# Patient Record
Sex: MALE | Race: BLACK OR AFRICAN AMERICAN | Employment: UNEMPLOYED | URBAN - METROPOLITAN AREA
[De-identification: names, ages, dates, MRNs, and addresses within clinical notes are randomized per-mention and may not be internally consistent; named-entity substitution may affect disease eponyms.]

---
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
## 2025-06-24 ENCOUNTER — OFFICE VISIT (OUTPATIENT)
Age: 25
End: 2025-06-24

## 2025-06-24 VITALS
WEIGHT: 293 LBS | BODY MASS INDEX: 44.41 KG/M2 | TEMPERATURE: 98.3 F | RESPIRATION RATE: 18 BRPM | HEIGHT: 68 IN | DIASTOLIC BLOOD PRESSURE: 86 MMHG | OXYGEN SATURATION: 97 % | HEART RATE: 76 BPM | SYSTOLIC BLOOD PRESSURE: 136 MMHG

## 2025-06-24 DIAGNOSIS — Z71.1 CONCERN ABOUT SEXUALLY TRANSMITTED DISEASE IN MALE WITHOUT DIAGNOSIS: ICD-10-CM

## 2025-06-24 DIAGNOSIS — R03.0 ELEVATED BLOOD PRESSURE READING: ICD-10-CM

## 2025-06-24 DIAGNOSIS — J02.9 SORE THROAT: Primary | ICD-10-CM

## 2025-06-24 PROBLEM — B34.9 VIRAL INFECTION: Status: ACTIVE | Noted: 2025-02-06

## 2025-06-24 LAB — S PYO AG THROAT QL: NORMAL

## 2025-06-24 NOTE — PATIENT INSTRUCTIONS
Thank you for visiting [facility] today.    We will call you with your test results once they are received.  Salt water gargles  Ibuprofen/Tylenol      A survey will be sent shortly to your phone/email.  Please complete this so we may know how to better serve you in the future.       DASH Diet: After Your Visit  Your Care Instructions  The DASH diet is an eating plan that can help lower your blood pressure. DASH stands for Dietary Approaches to Stop Hypertension. Hypertension is high blood pressure.  The DASH diet focuses on eating foods that are high in calcium, potassium, and magnesium. These nutrients can lower blood pressure. The foods that are highest in these nutrients are fruits, vegetables, low-fat dairy products, nuts, seeds, and legumes. But taking calcium, potassium, and magnesium supplements instead of eating foods that are high in those nutrients does not have the same effect. The DASH diet also includes whole grains, fish, and poultry.  The DASH diet is one of several lifestyle changes your doctor may recommend to lower your high blood pressure. Your doctor may also want you to decrease the amount of sodium in your diet. Lowering sodium while following the DASH diet can lower blood pressure even further than just the DASH diet alone.  Follow-up care is a key part of your treatment and safety. Be sure to make and go to all appointments, and call your doctor if you are having problems. It’s also a good idea to know your test results and keep a list of the medicines you take.  How can you care for yourself at home?  Following the DASH diet  Eat 4 to 5 servings of fruit each day. A serving is 1 medium-sized piece of fruit, ½ cup chopped or canned fruit, 1/4 cup dried fruit, or 4 ounces (½ cup) of fruit juice. Choose fruit more often than fruit juice.  Eat 4 to 5 servings of vegetables each day. A serving is 1 cup of lettuce or raw leafy vegetables, ½ cup of chopped or cooked vegetables, or 4

## 2025-06-24 NOTE — PROGRESS NOTES
Subjective     Chief Complaint   Patient presents with    Pharyngitis     Symptoms for 3 days    Other     May also want std testing done       Patient is a 25-year-old male presenting with a sore throat and concern for STD.  Patient states he had oral sex recently and ever since then has had a sore throat and swollen glands.          History reviewed. No pertinent past medical history.    History reviewed. No pertinent surgical history.    History reviewed. No pertinent family history.    No Known Allergies    Social History     Tobacco Use    Smoking status: Unknown       Vitals:    06/24/25 1848   BP: 136/86   Pulse:    Resp:    Temp:    SpO2:        Objective     Physical Exam  Vitals and nursing note reviewed.   Constitutional:       General: He is not in acute distress.     Appearance: Normal appearance. He is not ill-appearing.   HENT:      Head: Normocephalic and atraumatic.      Mouth/Throat:      Mouth: Mucous membranes are moist.      Pharynx: No oropharyngeal exudate or posterior oropharyngeal erythema.      Comments: White spots on tonsil - possible stones  Cardiovascular:      Rate and Rhythm: Normal rate.      Pulses: Normal pulses.   Pulmonary:      Effort: Pulmonary effort is normal.   Lymphadenopathy:      Cervical: No cervical adenopathy.   Skin:     General: Skin is warm and dry.   Neurological:      Mental Status: He is alert and oriented to person, place, and time.         Assessment & Plan     Diagnoses and all orders for this visit:  Sore throat  -     POCT rapid strep A  -     CT/NG by ELIANE, Pharyngeal; Future  Concern about sexually transmitted disease in male without diagnosis  -     Chlamydia, Gonorrhea, Trichomoniasis; Future      Office Visit on 06/24/2025   Component Date Value Ref Range Status    Strep A Ag 06/24/2025 None Detected  None Detected Final     Strep negative  Will send out test for pharyngeal chlamydia and gonorrhea   Will also send out urine for chlamydia, gonorrhea and

## 2025-06-28 LAB
C TRACH RRNA NPH QL NAA+PROBE: NEGATIVE
C TRACH RRNA SPEC QL NAA+PROBE: NEGATIVE
N GONORRHOEA RRNA SPEC QL NAA+PROBE: NEGATIVE
SPECIMEN SOURCE: NORMAL
SPECIMEN SOURCE: NORMAL
T VAGINALIS RRNA SPEC QL NAA+PROBE: NEGATIVE

## 2025-06-29 ENCOUNTER — RESULTS FOLLOW-UP (OUTPATIENT)
Age: 25
End: 2025-06-29

## 2025-07-01 ENCOUNTER — OFFICE VISIT (OUTPATIENT)
Age: 25
End: 2025-07-01

## 2025-07-01 VITALS
BODY MASS INDEX: 44.16 KG/M2 | OXYGEN SATURATION: 93 % | HEIGHT: 68 IN | RESPIRATION RATE: 18 BRPM | SYSTOLIC BLOOD PRESSURE: 136 MMHG | TEMPERATURE: 97.5 F | HEART RATE: 69 BPM | WEIGHT: 291.4 LBS | DIASTOLIC BLOOD PRESSURE: 85 MMHG

## 2025-07-01 DIAGNOSIS — A54.5 GONORRHEA OF PHARYNX: Primary | ICD-10-CM

## 2025-07-01 LAB
C TRACH RRNA NPH QL NAA+PROBE: NEGATIVE
N GONORRHOEA RRNA NPH QL NAA+PROBE: POSITIVE
SPECIMEN SOURCE: ABNORMAL

## 2025-07-01 RX ORDER — LIDOCAINE HYDROCHLORIDE 10 MG/ML
2.9 INJECTION, SOLUTION EPIDURAL; INFILTRATION; INTRACAUDAL; PERINEURAL ONCE
Status: COMPLETED | OUTPATIENT
Start: 2025-07-01 | End: 2025-07-01

## 2025-07-01 RX ADMIN — LIDOCAINE HYDROCHLORIDE 2.9 ML: 10 INJECTION, SOLUTION EPIDURAL; INFILTRATION; INTRACAUDAL; PERINEURAL at 17:53

## 2025-07-01 RX ADMIN — CEFTRIAXONE 1000 MG: 500 INJECTION, POWDER, FOR SOLUTION INTRAMUSCULAR; INTRAVENOUS at 17:39

## 2025-07-01 NOTE — PROGRESS NOTES
Negative   Final    Neisseria Gonorrhoeae, ELIANE 06/24/2025 Negative  Negative   Final    Trichomonas Vaginalis by ELIANE 06/24/2025 Negative  Negative   Final    Comment: (NOTE)  Performed At: [address] [id number]  Wild Todd MD Ph:[phone]       Rocephin administered without complication        I have discussed the results, diagnosis and treatment plan with the patient.  The patient also understands that early in the process of an illness, an urgent care workup can be falsely reassuring.  Routine discharge counseling and specific return precautions discussed with patient and the patient understands that worsening, changing or persistent symptoms should prompt an immediate return to the urgent care or emergency department.  Patient/Guardian expressed understanding and agrees with the discharge plan.  No further questions at time of discharge.    Orquidea Finney, APRN - CNP